# Patient Record
Sex: FEMALE | Race: OTHER | NOT HISPANIC OR LATINO | URBAN - METROPOLITAN AREA
[De-identification: names, ages, dates, MRNs, and addresses within clinical notes are randomized per-mention and may not be internally consistent; named-entity substitution may affect disease eponyms.]

---

## 2018-07-01 ENCOUNTER — OUTPATIENT (OUTPATIENT)
Dept: OUTPATIENT SERVICES | Facility: HOSPITAL | Age: 73
LOS: 1 days | End: 2018-07-01
Payer: MEDICARE

## 2018-07-01 PROCEDURE — G9001: CPT

## 2018-07-06 ENCOUNTER — EMERGENCY (EMERGENCY)
Facility: HOSPITAL | Age: 73
LOS: 1 days | Discharge: HOME | End: 2018-07-06
Admitting: EMERGENCY MEDICINE
Payer: COMMERCIAL

## 2018-07-06 VITALS
OXYGEN SATURATION: 98 % | SYSTOLIC BLOOD PRESSURE: 147 MMHG | HEIGHT: 59 IN | HEART RATE: 55 BPM | WEIGHT: 141.98 LBS | TEMPERATURE: 98 F | DIASTOLIC BLOOD PRESSURE: 74 MMHG | RESPIRATION RATE: 18 BRPM

## 2018-07-06 DIAGNOSIS — H92.01 OTALGIA, RIGHT EAR: ICD-10-CM

## 2018-07-06 DIAGNOSIS — H61.21 IMPACTED CERUMEN, RIGHT EAR: ICD-10-CM

## 2018-07-06 PROCEDURE — 69209 REMOVE IMPACTED EAR WAX UNI: CPT | Mod: RT

## 2018-07-06 PROCEDURE — 99282 EMERGENCY DEPT VISIT SF MDM: CPT | Mod: 25

## 2018-07-06 NOTE — ED ADULT NURSE NOTE - CHPI ED SYMPTOMS NEG
no loss of consciousness/no nausea/no numbness/no vomiting/no change in level of consciousness/no chills/no syncope/no blurred vision/no fever/no weakness

## 2018-07-06 NOTE — ED PROVIDER NOTE - MEDICAL DECISION MAKING DETAILS
72 y/o female with right ear pain and decrease hearing. Pt with cerumen impaction. Irrigated and removed a solid large mass of ear wax. Pt with improved symptoms. TM nml with no signs of infection. Will otherwise fu with ENT.

## 2018-07-06 NOTE — ED PROVIDER NOTE - OBJECTIVE STATEMENT
74 y/o female with right sided decrease hearing for many months. Pt states she has been told in the past she has a lot of wax in the right ear, however never had problems. Pt states she noticed pain and decrease hearing. She denies the following: fever, chills, runny nose, ringing in her ears, dizziness, n/v. Pt states she has been trying to remove the wax with q-tips, however has been unsuccessful.

## 2018-07-06 NOTE — ED ADULT NURSE NOTE - OBJECTIVE STATEMENT
73 yr old female patient with c/o of R ear pain [id number].  NO distress noted.  Breathing easily and unlabored.

## 2018-07-09 DIAGNOSIS — Z71.89 OTHER SPECIFIED COUNSELING: ICD-10-CM
